# Patient Record
Sex: FEMALE | Race: WHITE | Employment: UNEMPLOYED | URBAN - METROPOLITAN AREA
[De-identification: names, ages, dates, MRNs, and addresses within clinical notes are randomized per-mention and may not be internally consistent; named-entity substitution may affect disease eponyms.]

---

## 2021-01-17 ENCOUNTER — APPOINTMENT (OUTPATIENT)
Dept: GENERAL RADIOLOGY | Age: 66
End: 2021-01-17
Payer: MEDICAID

## 2021-01-17 ENCOUNTER — HOSPITAL ENCOUNTER (EMERGENCY)
Age: 66
Discharge: HOME | End: 2021-01-17
Payer: MEDICAID

## 2021-01-17 VITALS
HEART RATE: 74 BPM | OXYGEN SATURATION: 99 % | RESPIRATION RATE: 17 BRPM | SYSTOLIC BLOOD PRESSURE: 148 MMHG | TEMPERATURE: 98.9 F | WEIGHT: 175 LBS | BODY MASS INDEX: 32.2 KG/M2 | DIASTOLIC BLOOD PRESSURE: 86 MMHG | HEIGHT: 62 IN

## 2021-01-17 DIAGNOSIS — U07.1 PNEUMONIA DUE TO COVID-19 VIRUS: Primary | ICD-10-CM

## 2021-01-17 DIAGNOSIS — S39.012A STRAIN OF LUMBAR REGION, INITIAL ENCOUNTER: ICD-10-CM

## 2021-01-17 DIAGNOSIS — J12.82 PNEUMONIA DUE TO COVID-19 VIRUS: Primary | ICD-10-CM

## 2021-01-17 PROCEDURE — 72110 X-RAY EXAM L-2 SPINE 4/>VWS: CPT

## 2021-01-17 PROCEDURE — 71045 X-RAY EXAM CHEST 1 VIEW: CPT

## 2021-01-17 PROCEDURE — 99284 EMERGENCY DEPT VISIT MOD MDM: CPT

## 2021-01-17 ASSESSMENT — ENCOUNTER SYMPTOMS
VOMITING: 0
DIARRHEA: 0
BLOOD IN STOOL: 0
BACK PAIN: 1
COUGH: 1
SORE THROAT: 0
COLOR CHANGE: 0
EYE REDNESS: 0
ABDOMINAL PAIN: 0
SHORTNESS OF BREATH: 0
TROUBLE SWALLOWING: 0
CONSTIPATION: 0
EYE DISCHARGE: 0
NAUSEA: 0
EYE PAIN: 0
RHINORRHEA: 0
WHEEZING: 0

## 2021-01-17 ASSESSMENT — PAIN DESCRIPTION - ORIENTATION: ORIENTATION: MID

## 2021-01-17 ASSESSMENT — PAIN DESCRIPTION - FREQUENCY: FREQUENCY: INTERMITTENT

## 2021-01-17 NOTE — CARE COORDINATION
I discussed with the patient about her d/c plans. She had reported to her RN that she didn't want to go back to [facility]. She is stating \"I want to go home\" and not back to the nursing home. I discussed with her about whether she could take care of herself at home and she states \"yes\" but would need hhc which may not be able to start immediately due to the covid 19 restrictions. This may not meet the patient's needs at this time depending on her clinical course. I did report this to Gee Loaiza who will speak to the patient.

## 2021-01-17 NOTE — ED NOTES
[facility] contacted to transport patient back to [facility].       Vikas Hunter RN  01/17/21 2890

## 2021-01-17 NOTE — ED NOTES
Discharge instructions explained to both patient and [facility].       Preston Reeder RN  01/17/21 8647

## 2021-01-17 NOTE — ED PROVIDER NOTES
[address] [facility] ED  EMERGENCY DEPARTMENT ENCOUNTER      Pt Name: Lupis Jones  MRN: [id number]  [place] 1955  Date of evaluation: 1/17/2021  Provider: JOHN Ross CNP    CHIEF COMPLAINT       Chief Complaint   Patient presents with    Back Pain         HISTORY OF PRESENT ILLNESS   (Location/Symptom, Timing/Onset,Context/Setting, Quality, Duration, Modifying Factors, Severity)  Note limiting factors. Enedina Albarado is a 60 y.o. female who presents to the emergency department with a chart with  chief complaints of back pain. Patient reports that back pain has been constant within the past 3 days rating 7 out of 10 described as a sharp pain radiating up to her shoulder. She also states that she has been coughing up brown sputum and tested positive for Covid on Monday. She denies any nausea, vomiting, fevers or chills, alleviating or modifying factors. Denies numbness, tingling, loss of bladder bowel control. Nursing Notes were reviewed. REVIEW OF SYSTEMS    (2-9 systems for level 4, 10 or more for level 5)     Review of Systems   Constitutional: Negative for activity change, appetite change, fatigue and fever. HENT: Negative for congestion, ear pain, rhinorrhea, sore throat and trouble swallowing. Eyes: Negative for pain, discharge and redness. Respiratory: Positive for cough. Negative for shortness of breath and wheezing. Cardiovascular: Negative for chest pain and palpitations. Gastrointestinal: Negative for abdominal pain, blood in stool, constipation, diarrhea, nausea and vomiting. Endocrine: Negative for polydipsia and polyuria. Genitourinary: Negative for decreased urine volume, dysuria, flank pain and hematuria. Musculoskeletal: Positive for back pain. Negative for arthralgias and myalgias. Skin: Negative for color change, rash and wound. Neurological: Negative for dizziness, syncope, weakness, light-headedness and headaches.    Psychiatric/Behavioral: Negative for behavioral problems. All other systems reviewed and are negative. Except as noted above the remainder of the review of systems was reviewed and negative. PAST MEDICAL HISTORY   No past medical history on file. No past surgical history on file.   Social History     Socioeconomic History    Marital status:      Spouse name: Not on file    Number of children: Not on file    Years of education: Not on file    Highest education level: Not on file   Occupational History    Not on file   Social Needs    Financial resource strain: Not on file    Food insecurity     Worry: Not on file     Inability: Not on file    Transportation needs     Medical: Not on file     Non-medical: Not on file   Tobacco Use    Smoking status: Not on file   Substance and Sexual Activity    Alcohol use: Not on file    Drug use: Not on file    Sexual activity: Not on file   Lifestyle    Physical activity     Days per week: Not on file     Minutes per session: Not on file    Stress: Not on file   Relationships    Social connections     Talks on phone: Not on file     Gets together: Not on file     Attends [employer] service: Not on file     Active member of club or organization: Not on file     Attends meetings of clubs or organizations: Not on file     Relationship status: Not on file    Intimate partner violence     Fear of current or ex partner: Not on file     Emotionally abused: Not on file     Physically abused: Not on file     Forced sexual activity: Not on file   Other Topics Concern    Not on file   Social History Narrative    Not on file       SCREENINGS    Sidney Coma Scale  Eye Opening: Spontaneous  Best Verbal Response: Oriented  Best Motor Response: Obeys commands  Sidney Coma Scale Score: 15        PHYSICAL EXAM    (up to 7 for level 4, 8 or more for level 5)     ED Triage Vitals [01/17/21 0854]   BP Temp Temp Source Pulse Resp SpO2 Height Weight   (!) 153/77 98.9 °F (37.2 °C) Oral 65 14 97 % 5' 2\" (1.575 m) 175 lb (79.4 kg)       Physical Exam  Vitals signs and nursing note reviewed. Constitutional:       General: She is not in acute distress. Appearance: She is well-developed. She is not diaphoretic. HENT:      Head: Normocephalic and atraumatic. Nose: Nose normal.   Eyes:      Conjunctiva/sclera: Conjunctivae normal.      Pupils: Pupils are equal, round, and reactive to light. Neck:      Musculoskeletal: Normal range of motion and neck supple. Cardiovascular:      Rate and Rhythm: Normal rate and regular rhythm. Heart sounds: Normal heart sounds. Pulmonary:      Effort: Pulmonary effort is normal. No respiratory distress. Breath sounds: Normal breath sounds. No wheezing. Abdominal:      General: Bowel sounds are normal.      Palpations: Abdomen is soft. Tenderness: There is no abdominal tenderness. Skin:     General: Skin is warm and dry. Capillary Refill: Capillary refill takes less than 2 seconds. Findings: No rash. Neurological:      Mental Status: She is alert and oriented to person, place, and time. Cranial Nerves: No cranial nerve deficit. Psychiatric:         Behavior: Behavior normal.         RESULTS     EKG: All EKG's are interpreted by the Emergency Department Physician who either signs or Co-signsthis chart in the absence of a cardiologist.        RADIOLOGY:   Nicole Galeazzi such as CT, Ultrasound and MRI are read by the radiologist. Plain radiographic images are visualized and preliminarily interpreted by the emergency physician with the below findings:        Interpretation per the Radiologist below, if available at the time ofthis note:    XR CHEST PORTABLE   Final Result   There are alveolar opacities which may represent early infiltrates, pneumonia. The differential includes COVID-19 pneumonia versus other etiologies            XR LUMBAR SPINE (MIN 4 VIEWS)   Final Result   There are no acute osseous changes.  Status post discectomy, facial placement and posterior fusion at L4-5. ED BEDSIDE ULTRASOUND:   Performed by ED Physician - none    LABS:  Labs Reviewed - No data to display    All other labs were within normal range or not returned as of this dictation. EMERGENCY DEPARTMENT COURSE and DIFFERENTIAL DIAGNOSIS/MDM:   Vitals:    Vitals:    01/17/21 1040 01/17/21 1100 01/17/21 1101 01/17/21 1104   BP: (!) 162/64 (!) 148/86     Pulse: 75 72 77 74   Resp: 21 16 16 17   Temp:       TempSrc:       SpO2: 100%  98% 99%   Weight:       Height:                MDM   Patient is 64-year-old female presenting to the ER with a chief complaint of back pain and cough. Patient is hemodynamically stable nontoxic-appearing. She states she tested positive for Covid on Monday. Work-up initiated and patient will be reevaluated. COVID pneumonia identified but no lumbar complaints. Patient is 99% on RA. Instructed to f/u with PCP and return back to the ED if worse in anyway. DC in a stable condition with stable vital signs. CRITICAL CARE TIME       CONSULTS:  None    PROCEDURES:  Unless otherwise noted below, none     Procedures    FINAL IMPRESSION      1. Pneumonia due to COVID-19 virus    2. Strain of lumbar region, initial encounter          DISPOSITION/PLAN   DISPOSITION Decision To Discharge 01/17/2021 11:08:27 AM      PATIENT REFERRED TO:  Wilfred Puente MD  [address] [place] [address] Drive    Schedule an appointment as soon as possible for a visit in 1 day        DISCHARGE MEDICATIONS:  There are no discharge medications for this patient.          (Please notethat portions of this note were completed with a voice recognition program.  Efforts were made to edit the dictations but occasionally words are mis-transcribed.)    JOHN Razo CNP (electronically signed)  Attending Emergency Physician          JOHN Johnson CNP  01/18/21 1012

## 2021-01-17 NOTE — ED NOTES
Patient on call light requesting Xanax, Plaquenil and a phone . Explained to patient that Jodi López NP declined to order Xanax. Informed patient that she does not have her phone  with her. Patient states she needs Plaquenil for her COVID.      Tiffanie Rodrigez RN  01/17/21 5687

## 2021-01-17 NOTE — ED NOTES
Patient states that she brought a bag of belongings with her. No bag in room. Attempted to call [facility] but was just placed on hold. [facility] called and states that they do not have any belongings.       Iris Preston RN  01/17/21 4839

## 2021-01-17 NOTE — ED NOTES
[facility] notified that patient will be discharged back into their care.       Alisha Arcos RN  01/17/21 4131

## 2023-04-25 ENCOUNTER — HOME VISIT (OUTPATIENT)
Dept: POST ACUTE CARE | Facility: EXTERNAL LOCATION | Age: 68
End: 2023-04-25
Payer: COMMERCIAL

## 2023-04-25 DIAGNOSIS — E11.40 TYPE 2 DIABETES MELLITUS WITH DIABETIC NEUROPATHY, UNSPECIFIED WHETHER LONG TERM INSULIN USE (MULTI): Primary | ICD-10-CM

## 2023-04-25 DIAGNOSIS — I11.9 CARDIOMYOPATHY, HYPERTENSIVE, BENIGN, WITHOUT HEART FAILURE (MULTI): ICD-10-CM

## 2023-04-25 DIAGNOSIS — M45.9 ANKYLOSING SPONDYLITIS, UNSPECIFIED SITE OF SPINE (MULTI): ICD-10-CM

## 2023-04-25 DIAGNOSIS — G47.429 NARCOLEPSY DUE TO UNDERLYING CONDITION WITHOUT CATAPLEXY (HHS-HCC): ICD-10-CM

## 2023-04-25 DIAGNOSIS — I63.9 CEREBROVASCULAR ACCIDENT (CVA), UNSPECIFIED MECHANISM (MULTI): ICD-10-CM

## 2023-04-25 DIAGNOSIS — I43 CARDIOMYOPATHY, HYPERTENSIVE, BENIGN, WITHOUT HEART FAILURE (MULTI): ICD-10-CM

## 2023-04-25 PROCEDURE — 99349 HOME/RES VST EST MOD MDM 40: CPT | Performed by: EMERGENCY MEDICINE

## 2023-04-25 NOTE — PROGRESS NOTES
Provider Impression     67-year-old     Alison Soto     candidal dermatitis   type 2 diabetes   obesity   hypercholesterolemia   generalized anxiety   narcolepsy   hypertension   CVA intracerebral hemorrhage   pharyngitis sinusitis   ankylosing spondylosis   spinal stenosis   unsteady gait   dizziness and giddiness   generalized weakness          Xanax for anxiety   amlodipine for high blood pressure   aspirin   Lipitor for hyperlipidemia   baclofen   duloxetine also for depression   losartan for hypertension   metformin for diabetes   metoprolol also for hypertension   pantoprazole for GERD   tolterodine at bedtime     -Fall prevention    -Cognitive engagement     -Monitor and treat blood pressure     -Aggressive decubitus ulcer prevention.     -Bowel and bladder care     -Optimal nutrition and supplementation as needed     -GI  and DVT prophylaxis     -Symptom control     -Ambulation as tolerated     -Will follow       Charting was completed using electronic voice recognition technology and may have unintended errors which may not have been completely corrected.        History of Present Illness  67-year-old lady     Alison Soto     Patient is unable to give detailed history and therefore history is obtained from the chart     No acute complaints or concerns raised by nursing     Family history-Patient's family history is not contributory to their current health issues     Social history-neg smoking alcohol or illicit drugs     Allergies-as per nursing documentation                 Review of Systems  All system review as allowed by patient condition and nursing input is negative        Active Problems  Problems    · Acne, unspecified acne type (706.1) (L70.9)   · Ankylosis of left shoulder (718.51) (M24.612)   · Anxiety (300.00) (F41.9)   · Asymptomatic postmenopausal state (V49.81) (Z78.0)   · Back pain (724.5) (M54.9)   · BMI 37.0-37.9, adult (V85.37) (Z68.37)   · Brain bleed (431) (I61.9)   · Breast  screening (V76.10) (Z12.39)   · Cerebral infarction (434.91) (I63.9)   · Chronic constipation (564.00) (K59.09)   · Chronic low back pain (724.2,338.29) (M54.50,G89.29)   · Class 2 obesity due to excess calories with body mass index (BMI) of 36.0 to 36.9 in  adult, unspecified whether serious comorbidity present (278.00,V85.36) (E66.09,Z68.36)   · Colon cancer screening (V76.51) (Z12.11)   · Cough present for greater than 3 weeks (786.2) (R05.8)   · CVA (cerebral vascular accident) (434.91) (I63.9)   · Depression with anxiety (300.4) (F41.8)   · Diabetes mellitus (250.00) (E11.9)   · Dizziness (780.4) (R42)   · Drainage from wound (879.8) (L24.A9)   · Elevated LDL cholesterol level (272.0) (E78.00)   · Elevated TSH (794.5) (R79.89)   · Encounter for initial annual wellness visit in Medicare patient (V70.0) (Z00.00)   · Excessive daytime sleepiness (780.54) (G47.19)   · Feeling weak (780.79) (R53.1)   · Gait instability (781.2) (R26.81)   · Generalized anxiety disorder (300.02) (F41.1)   · GERD (gastroesophageal reflux disease) (530.81) (K21.9)   · High risk medication use (V58.69) (Z79.899)   · History of stroke with residual deficit (438.9) (I69.30)   · Hoarseness (784.42) (R49.0)   · Hospital discharge follow-up (V67.59) (Z09)   · Hypertension (401.9) (I10)   · Left-sided weakness (728.87) (R53.1)   · Lumbar disc herniation (722.10) (M51.26)   · Lumbar foraminal stenosis (724.02) (M48.061)   · Lumbar postlaminectomy syndrome (722.83) (M96.1)   · Lumbar spondylosis (721.3) (M47.816)   · Lumbosacral neuritis (724.4) (M54.17)   · Lumbosacral stenosis (724.02) (M48.07)   · Mixed stress and urge urinary incontinence (788.33) (N39.46)   · Muscle spasm (728.85) (M62.838)   · Narcolepsy (347.00) (G47.419)   · Obesity (BMI 30.0-34.9) (278.00) (E66.9)   · Osteoarthritis (715.90) (M19.90)   · Osteopenia (733.90) (M85.80)   · Osteophyte of hip (726.5) (M25.759)   · Pain disorder associated with psychological and physical  factors (307.89) (F45.42)   · Pharyngitis (462) (J02.9)   · Post-void dribbling (788.35) (N39.43)   · Proteinuria (791.0) (R80.9)   · Pseudobulbar affect (310.81) (F48.2)   · Rash (782.1) (R21)   · Sinusitis (473.9) (J32.9)   · Snoring (786.09) (R06.83)   · Spasticity (781.0) (R25.2)   · Spinal stenosis of lumbar region with neurogenic claudication (724.03) (M48.062)   · Thrush, oral (112.0) (B37.0)   · Vertigo (780.4) (R42)   · Vitamin D deficiency (268.9) (E55.9)   · Welcome to Medicare preventive visit (V70.0) (Z00.00)     Past Medical History  Problems    · History of Arthralgia of back (724.5) (M54.9)   · History of Chronic pain (338.29) (G89.29)   · History of Controlled diabetes mellitus (250.00) (E11.9)   · History of Depression with anxiety (300.4) (F41.8)   · History of Elevated serum cholesterol (272.9) (E78.9)   · History of depression (V11.8) (Z86.59)   · Added by Problem List Migration; 2013   · History of esophageal reflux (V12.79) (Z87.19)   · History of headache (V13.89) (Z87.898)   · History of herpes zoster (V12.09) (Z86.19)   · History of hypertension (V12.59) (Z86.79)   · History of hypertension (V12.59) (Z86.79)   · History of osteoarthritis (V13.4) (Z87.39)   · Added by Problem List Migration; 2013   · History of rheumatoid arthritis (V13.4) (Z87.39)   · History of Limb pain (729.5) (M79.609)   · R LEG PAIN   · History of Primary generalized (osteo)arthritis (715.09) (M15.0)   · History of Spastic hemiplegia affecting left nondominant side (342.12) (G81.14)   · Resolved Date: 18 Oct 2016   · History of Visit for screening mammogram (V76.12) (Z12.31)     Surgical History  Problems    · History of Arm surgery     Family History  Mother    · Family history of Congestive Heart Failure   · Family history of    · Family history of Osteoarthritis (V17.7)  Father    · Family history of Colon Cancer (V16.0)   · Family history of   Family History    · Family history of Colon  Cancer (V16.0)   · Family history of Hypertension (V17.49)   · Family history of Stroke Syndrome (V17.1)     Social History  Problems    · Denied: History of Alcohol   · Denied: History of Alcohol Use (History)   · Denied: History of Drug Use   · H/O nicotine dependence (V15.82) (Z87.891)   · Marital History - Currently    · Never smoker   · No illicit drug use     Allergies  NoKnown    · No Known Allergies   Recorded By: Desi Tavares; 2/26/2019 1:48:03 PM     Physical Exam     Vital signs as per nursing/MA documentation were reviewd  General appearance: Alert and in no acute distress  HEENT: Normal Inspection  Neck - Normal Inspectiopn  Respiratory : No respiratory distress. Lungs are clear   Cardiovascular: heart rate normal. No gallop  Back - normal inspection  Skin inspection:Warm  Musculoskeletal : No deformities  Neuro : Limited exam. Baseline  Psychiatric : Cooperative        Results/Data  Records including but not limited to electronic medical records, relevant lab work and imaging from patient's health care facility encounter were reviewed and independently verified

## 2023-05-16 ENCOUNTER — HOME VISIT (OUTPATIENT)
Dept: POST ACUTE CARE | Facility: EXTERNAL LOCATION | Age: 68
End: 2023-05-16
Payer: COMMERCIAL

## 2023-05-16 DIAGNOSIS — I63.9 CEREBROVASCULAR ACCIDENT (CVA), UNSPECIFIED MECHANISM (MULTI): Primary | ICD-10-CM

## 2023-05-16 DIAGNOSIS — I43 CARDIOMYOPATHY, HYPERTENSIVE, BENIGN, WITHOUT HEART FAILURE (MULTI): ICD-10-CM

## 2023-05-16 DIAGNOSIS — M45.9 ANKYLOSING SPONDYLITIS, UNSPECIFIED SITE OF SPINE (MULTI): ICD-10-CM

## 2023-05-16 DIAGNOSIS — I11.9 CARDIOMYOPATHY, HYPERTENSIVE, BENIGN, WITHOUT HEART FAILURE (MULTI): ICD-10-CM

## 2023-05-16 DIAGNOSIS — E11.40 TYPE 2 DIABETES MELLITUS WITH DIABETIC NEUROPATHY, UNSPECIFIED WHETHER LONG TERM INSULIN USE (MULTI): ICD-10-CM

## 2023-05-16 PROCEDURE — 99349 HOME/RES VST EST MOD MDM 40: CPT | Performed by: EMERGENCY MEDICINE

## 2023-05-16 NOTE — PROGRESS NOTES
Provider Impression     67-year-old     [place]     candidal dermatitis   type 2 diabetes   obesity   hypercholesterolemia   generalized anxiety   narcolepsy   hypertension   CVA intracerebral hemorrhage   pharyngitis sinusitis   ankylosing spondylosis   spinal stenosis   unsteady gait   dizziness and giddiness   generalized weakness          Xanax for anxiety   amlodipine for high blood pressure   aspirin   Lipitor for hyperlipidemia   baclofen   duloxetine also for depression   losartan for hypertension   metformin for diabetes   metoprolol also for hypertension   pantoprazole for GERD   tolterodine at bedtime     Rx list reviewed.   PT and OT evaluation is in the process.   Routine safety measures, fall precautions, risk modification and alarm placement if needed for prevention of falls.   Skin care precautions, prevention of pressures sores at pressure points assessed.   Pt needs to be monitored frequently by nursing staff particularly at night time.   Any confusion, agitation or behavioural disturbance needs to be attended, as per home policy   rapid covid Ag assay need to be done, notify if positive.   If needed appropriate measures to be taken for alarm placements and assisted devices, pt was told not to get up and ambulate at night unless help and assist available at bedside,   labs will be done as per our routine protocol.   PO intake need to be monitored if consuming po.         Charting was completed using electronic voice recognition technology and may have unintended errors which may not have been completely corrected.        History of Present Illness  67-year-old lady     [place]     Patient is unable to give detailed history and therefore history is obtained from the chart     No acute complaints or concerns raised by nursing     Family history-Patient's family history is not contributory to their current health issues     Social history-neg smoking alcohol or illicit drugs      Allergies-as per nursing documentation                 Review of Systems  All system review as allowed by patient condition and nursing input is negative        Active Problems  Problems    · Acne, unspecified acne type (706.1) (L70.9)   · Ankylosis of left shoulder (718.51) (M24.612)   · Anxiety (300.00) (F41.9)   · Asymptomatic postmenopausal state (V49.81) (Z78.0)   · Back pain (724.5) (M54.9)   · BMI 37.0-37.9, adult (V85.37) (Z68.37)   · Brain bleed (431) (I61.9)   · Breast screening (V76.10) (Z12.39)   · Cerebral infarction (434.91) (I63.9)   · Chronic constipation (564.00) (K59.09)   · Chronic low back pain (724.2,338.29) (M54.50,G89.29)   · Class 2 obesity due to excess calories with body mass index (BMI) of 36.0 to 36.9 in  adult, unspecified whether serious comorbidity present (278.00,V85.36) (E66.09,Z68.36)   · Colon cancer screening (V76.51) (Z12.11)   · Cough present for greater than 3 weeks (786.2) (R05.8)   · CVA (cerebral vascular accident) (434.91) (I63.9)   · Depression with anxiety (300.4) (F41.8)   · Diabetes mellitus (250.00) (E11.9)   · Dizziness (780.4) (R42)   · Drainage from wound (879.8) (L24.A9)   · Elevated LDL cholesterol level (272.0) (E78.00)   · Elevated TSH (794.5) (R79.89)   · Encounter for initial annual wellness visit in Medicare patient (V70.0) (Z00.00)   · Excessive daytime sleepiness (780.54) (G47.19)   · Feeling weak (780.79) (R53.1)   · Gait instability (781.2) (R26.81)   · Generalized anxiety disorder (300.02) (F41.1)   · GERD (gastroesophageal reflux disease) (530.81) (K21.9)   · High risk medication use (V58.69) (Z79.899)   · History of stroke with residual deficit (438.9) (I69.30)   · Hoarseness (784.42) (R49.0)   · Hospital discharge follow-up (V67.59) (Z09)   · Hypertension (401.9) (I10)   · Left-sided weakness (728.87) (R53.1)   · Lumbar disc herniation (722.10) (M51.26)   · Lumbar foraminal stenosis (724.02) (M48.061)   · Lumbar postlaminectomy syndrome (722.83)  (M96.1)   · Lumbar spondylosis (721.3) (M47.816)   · Lumbosacral neuritis (724.4) (M54.17)   · Lumbosacral stenosis (724.02) (M48.07)   · Mixed stress and urge urinary incontinence (788.33) (N39.46)   · Muscle spasm (728.85) (M62.838)   · Narcolepsy (347.00) (G47.419)   · Obesity (BMI 30.0-34.9) (278.00) (E66.9)   · Osteoarthritis (715.90) (M19.90)   · Osteopenia (733.90) (M85.80)   · Osteophyte of hip (726.5) (M25.759)   · Pain disorder associated with psychological and physical factors (307.89) (F45.42)   · Pharyngitis (462) (J02.9)   · Post-void dribbling (788.35) (N39.43)   · Proteinuria (791.0) (R80.9)   · Pseudobulbar affect (310.81) (F48.2)   · Rash (782.1) (R21)   · Sinusitis (473.9) (J32.9)   · Snoring (786.09) (R06.83)   · Spasticity (781.0) (R25.2)   · Spinal stenosis of lumbar region with neurogenic claudication (724.03) (M48.062)   · Thrush, oral (112.0) (B37.0)   · Vertigo (780.4) (R42)   · Vitamin D deficiency (268.9) (E55.9)   · Welcome to Medicare preventive visit (V70.0) (Z00.00)     Past Medical History  Problems    · History of Arthralgia of back (724.5) (M54.9)   · History of Chronic pain (338.29) (G89.29)   · History of Controlled diabetes mellitus (250.00) (E11.9)   · History of Depression with anxiety (300.4) (F41.8)   · History of Elevated serum cholesterol (272.9) (E78.9)   · History of depression (V11.8) (Z86.59)   · Added by Problem List Migration; 2013-4-2   · History of esophageal reflux (V12.79) (Z87.19)   · History of headache (V13.89) (Z87.898)   · History of herpes zoster (V12.09) (Z86.19)   · History of hypertension (V12.59) (Z86.79)   · History of hypertension (V12.59) (Z86.79)   · History of osteoarthritis (V13.4) (Z87.39)   · Added by Problem List Migration; 2013-4-2   · History of rheumatoid arthritis (V13.4) (Z87.39)   · History of Limb pain (729.5) (M79.609)   · R LEG PAIN   · History of Primary generalized (osteo)arthritis (715.09) (M15.0)   · History of Spastic hemiplegia  affecting left nondominant side (342.12) (G81.14)   · Resolved Date: 18 Oct 2016   · History of Visit for screening mammogram (V76.12) (Z12.31)     Surgical History  Problems    · History of Arm surgery     Family History  Mother    · Family history of Congestive Heart Failure   · Family history of    · Family history of Osteoarthritis (V17.7)  Father    · Family history of Colon Cancer (V16.0)   · Family history of   Family History    · Family history of Colon Cancer (V16.0)   · Family history of Hypertension (V17.49)   · Family history of Stroke Syndrome (V17.1)     Social History  Problems    · Denied: History of Alcohol   · Denied: History of Alcohol Use (History)   · Denied: History of Drug Use   · H/O nicotine dependence (V15.82) (Z87.891)   · Marital History - Currently    · Never smoker   · No illicit drug use     Allergies  NoKnown    · No Known Allergies   Recorded By: Desi Tavares; 2019 1:48:03 PM     Physical Exam     Vital signs as per nursing/MA documentation were reviewd  General appearance: Alert and in no acute distress  HEENT: Normal Inspection  Neck - Normal Inspectiopn  Respiratory : No respiratory distress. Lungs are clear   Cardiovascular: heart rate normal. No gallop  Back - normal inspection  Skin inspection:Warm  Musculoskeletal : No deformities  Neuro : Limited exam. Baseline  Psychiatric : Cooperative        Results/Data  Records including but not limited to electronic medical records, relevant lab work and imaging from patient's health care facility encounter were reviewed and independently verified

## 2024-09-11 ENCOUNTER — APPOINTMENT (OUTPATIENT)
Dept: OTOLARYNGOLOGY | Facility: CLINIC | Age: 69
End: 2024-09-11
Payer: COMMERCIAL